# Patient Record
Sex: FEMALE | Race: OTHER | NOT HISPANIC OR LATINO | URBAN - METROPOLITAN AREA
[De-identification: names, ages, dates, MRNs, and addresses within clinical notes are randomized per-mention and may not be internally consistent; named-entity substitution may affect disease eponyms.]

---
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
## 2022-05-11 NOTE — ED PROVIDER NOTE - MEDICAL DECISION MAKING DETAILS
No difficulties
Pt cleared by trauma Dr. Murray who d/w Dr. Reyna.  Pt feels well.  Tolerating PO and ambulating with steady gait in ER.  Pt ready for d/c home.  Splint placed on L wrist for comfort.

## 2022-05-26 NOTE — ED PROVIDER NOTE - DISPOSITION TYPE
"S: The patient is seen in follow-up following a consultation in the main hospital after she had ingested gabapentin, zolpidem and alcohol.  The patient has been participating in the intensive outpatient program for several weeks but wished to see this physician relating to \"stress and anxiety\".  She is denying any suicidal ideations at this time.  She denies any prior history of treatment for psychiatric illness and is interested in initiating medication for anxiety, depression and sleep.  For more complete history of present illness please refer to the previous dictated consultation note.    O: Awake alert and oriented in all spheres.  Mood euthymic affect congruent.  Speech relevant and coherent.  No suicidal or homicidal ideations Honorio features.  Judgement and insight intact    A: Alcohol use disorder, depressive disorder unspecified    P: Patient agrees to a trial of Lexapro 10 mg daily to address symptoms of depression and anxiety.  Additionally she will be started on as needed Vistaril 25 q6 and trazodone 50 HS prn for anxiety and sleep respectively  " DISCHARGE